# Patient Record
Sex: FEMALE
[De-identification: names, ages, dates, MRNs, and addresses within clinical notes are randomized per-mention and may not be internally consistent; named-entity substitution may affect disease eponyms.]

---

## 2018-10-10 ENCOUNTER — RX ONLY (OUTPATIENT)
Age: 63
Setting detail: RX ONLY
End: 2018-10-10

## 2018-10-24 ENCOUNTER — RX ONLY (OUTPATIENT)
Age: 63
Setting detail: RX ONLY
End: 2018-10-24

## 2018-12-07 ENCOUNTER — RX ONLY (OUTPATIENT)
Age: 63
Setting detail: RX ONLY
End: 2018-12-07

## 2022-02-14 ENCOUNTER — APPOINTMENT (RX ONLY)
Dept: URBAN - METROPOLITAN AREA CLINIC 87 | Facility: CLINIC | Age: 67
Setting detail: DERMATOLOGY
End: 2022-02-14

## 2022-02-14 VITALS — TEMPERATURE: 97.6 F | WEIGHT: 115 LBS | HEIGHT: 63 IN

## 2022-02-14 VITALS — HEIGHT: 63 IN | WEIGHT: 115 LBS

## 2022-02-14 DIAGNOSIS — L40.0 PSORIASIS VULGARIS: ICD-10-CM

## 2022-02-14 DIAGNOSIS — I73.81 ERYTHROMELALGIA: ICD-10-CM

## 2022-02-14 PROCEDURE — ? PATIENT SPECIFIC COUNSELING

## 2022-02-14 PROCEDURE — ? DIAGNOSIS COMMENT

## 2022-02-14 PROCEDURE — ? COUNSELING

## 2022-02-14 PROCEDURE — 99203 OFFICE O/P NEW LOW 30 MIN: CPT

## 2022-02-14 PROCEDURE — ? PRESCRIPTION

## 2022-02-14 PROCEDURE — ? TREATMENT REGIMEN

## 2022-02-14 RX ORDER — CLOBETASOL PROPIONATE 0.5 MG/G
OINTMENT TOPICAL
Qty: 60 | Refills: 5 | Status: ERX | COMMUNITY
Start: 2022-02-14

## 2022-02-14 RX ORDER — GABAPENTIN 100 MG/1
CAPSULE ORAL
Qty: 90 | Refills: 0 | Status: ERX | COMMUNITY
Start: 2022-02-14

## 2022-02-14 RX ORDER — TACROLIMUS 1 MG/G
OINTMENT TOPICAL
Qty: 100 | Refills: 5 | Status: ERX | COMMUNITY
Start: 2022-02-14

## 2022-02-14 RX ADMIN — GABAPENTIN: 100 CAPSULE ORAL at 00:00

## 2022-02-14 RX ADMIN — TACROLIMUS: 1 OINTMENT TOPICAL at 00:00

## 2022-02-14 RX ADMIN — CLOBETASOL PROPIONATE: 0.5 OINTMENT TOPICAL at 00:00

## 2022-02-14 ASSESSMENT — LOCATION SIMPLE DESCRIPTION DERM
LOCATION SIMPLE: LEFT UPPER ARM
LOCATION SIMPLE: LEFT HAND
LOCATION SIMPLE: RIGHT LOWER BACK
LOCATION SIMPLE: RIGHT HAND
LOCATION SIMPLE: RIGHT FOREARM
LOCATION SIMPLE: CHEST

## 2022-02-14 ASSESSMENT — LOCATION ZONE DERM
LOCATION ZONE: HAND
LOCATION ZONE: ARM
LOCATION ZONE: TRUNK

## 2022-02-14 ASSESSMENT — LOCATION DETAILED DESCRIPTION DERM
LOCATION DETAILED: RIGHT INFERIOR LATERAL MIDBACK
LOCATION DETAILED: LEFT ANTECUBITAL SKIN
LOCATION DETAILED: RIGHT VENTRAL MEDIAL PROXIMAL FOREARM
LOCATION DETAILED: RIGHT RADIAL PALM
LOCATION DETAILED: LEFT THENAR EMINENCE
LOCATION DETAILED: UPPER STERNUM

## 2022-02-14 NOTE — PROCEDURE: PATIENT SPECIFIC COUNSELING
Currently pt was not flaring, so the diagnosis is based on history. Ddx: psoriasis, hives, Raynaud’s. Pt denies any arthritic symptoms and reports that she is otherwise healthy.
Detail Level: Zone

## 2022-02-14 NOTE — PROCEDURE: TREATMENT REGIMEN
Action 4: Continue
Otc Regimen: Moisturizing creams
Detail Level: Generalized
Continue Regimen: Clobetasol cream bid prn up to 2 week to trunk and extremities; tacrolimus ointment bid prn to face and mild flares

## 2022-02-14 NOTE — HPI: RASH
How Severe Is Your Rash?: moderate
Is This A New Presentation, Or A Follow-Up?: Rash
Additional History: Pt reports that she has had itchy red spots on the trunk and extremities for years. She was diagnosed with psoriasis and has been applying clobetasol cream for these lesions and tacrolimus ointment for lesions on the face. More recently, she has developed redness and a tingling or burning sensation at the hands and at a lesser degree to the feet. She believes that they worsen with water exposure. She currently is not flaring.

## 2022-03-24 ENCOUNTER — APPOINTMENT (RX ONLY)
Dept: URBAN - METROPOLITAN AREA CLINIC 87 | Facility: CLINIC | Age: 67
Setting detail: DERMATOLOGY
End: 2022-03-24

## 2022-03-24 DIAGNOSIS — L40.0 PSORIASIS VULGARIS: ICD-10-CM

## 2022-03-24 DIAGNOSIS — I73.81 ERYTHROMELALGIA: ICD-10-CM

## 2022-03-24 PROCEDURE — ? DIAGNOSIS COMMENT

## 2022-03-24 PROCEDURE — ? PRESCRIPTION

## 2022-03-24 PROCEDURE — ? TREATMENT REGIMEN

## 2022-03-24 PROCEDURE — ? COUNSELING

## 2022-03-24 PROCEDURE — 99213 OFFICE O/P EST LOW 20 MIN: CPT

## 2022-03-24 PROCEDURE — ? PATIENT SPECIFIC COUNSELING

## 2022-03-24 RX ORDER — LIDOCAINE 50 MG/G
OINTMENT TOPICAL
Qty: 240 | Refills: 0 | Status: ERX | COMMUNITY
Start: 2022-03-24

## 2022-03-24 RX ORDER — CETIRIZINE HYDROCHLORIDE 10 MG/1
TABLET, FILM COATED ORAL
Qty: 30 | Refills: 0 | Status: ERX | COMMUNITY
Start: 2022-03-24

## 2022-03-24 RX ADMIN — LIDOCAINE: 50 OINTMENT TOPICAL at 00:00

## 2022-03-24 RX ADMIN — CETIRIZINE HYDROCHLORIDE: 10 TABLET, FILM COATED ORAL at 00:00

## 2022-03-24 ASSESSMENT — LOCATION DETAILED DESCRIPTION DERM
LOCATION DETAILED: RIGHT RADIAL PALM
LOCATION DETAILED: RIGHT INFERIOR LATERAL MIDBACK
LOCATION DETAILED: RIGHT VENTRAL MEDIAL PROXIMAL FOREARM
LOCATION DETAILED: LEFT ANTECUBITAL SKIN
LOCATION DETAILED: UPPER STERNUM
LOCATION DETAILED: LEFT THENAR EMINENCE

## 2022-03-24 ASSESSMENT — LOCATION SIMPLE DESCRIPTION DERM
LOCATION SIMPLE: RIGHT LOWER BACK
LOCATION SIMPLE: LEFT UPPER ARM
LOCATION SIMPLE: LEFT HAND
LOCATION SIMPLE: RIGHT FOREARM
LOCATION SIMPLE: CHEST
LOCATION SIMPLE: RIGHT HAND

## 2022-03-24 ASSESSMENT — LOCATION ZONE DERM
LOCATION ZONE: TRUNK
LOCATION ZONE: HAND
LOCATION ZONE: ARM

## 2022-03-24 NOTE — PROCEDURE: TREATMENT REGIMEN
Action 1: Continue
Detail Level: Generalized
Initiate Treatment: lidocaine 5 % topical ointment : Apply to affected areas on hands up to 3 times a day\\n\\ncetirizine 10 mg tablet : Take 1 tablet once a day
Continue Regimen: Clobetasol cream bid prn up to 2 week to trunk and extremities; tacrolimus ointment bid prn to face and mild flares
Otc Regimen: Moisturizing creams
Otc Regimen: Baby aspirin
Detail Level: Zone

## 2022-03-24 NOTE — PROCEDURE: PATIENT SPECIFIC COUNSELING
Discussed with patient the importance of avoiding heat and sun. Pt can treat symptomatically with cool compresses. We can try cetirizine and topical lidocaine. There are other treatment options such as nifedipine, beta blockers and antidepressants, but pt may need to see her PCP for these options.
Detail Level: Zone

## 2022-04-25 ENCOUNTER — APPOINTMENT (RX ONLY)
Dept: URBAN - METROPOLITAN AREA CLINIC 87 | Facility: CLINIC | Age: 67
Setting detail: DERMATOLOGY
End: 2022-04-25

## 2022-04-25 DIAGNOSIS — L40.0 PSORIASIS VULGARIS: ICD-10-CM

## 2022-04-25 DIAGNOSIS — I73.81 ERYTHROMELALGIA: ICD-10-CM

## 2022-04-25 PROBLEM — L30.9 DERMATITIS, UNSPECIFIED: Status: ACTIVE | Noted: 2022-04-25

## 2022-04-25 PROCEDURE — 99213 OFFICE O/P EST LOW 20 MIN: CPT

## 2022-04-25 PROCEDURE — ? PRESCRIPTION

## 2022-04-25 PROCEDURE — ? PATIENT SPECIFIC COUNSELING

## 2022-04-25 PROCEDURE — ? TREATMENT REGIMEN

## 2022-04-25 PROCEDURE — ? ADDITIONAL NOTES

## 2022-04-25 PROCEDURE — ? DIAGNOSIS COMMENT

## 2022-04-25 PROCEDURE — ? COUNSELING

## 2022-04-25 RX ORDER — TACROLIMUS 1 MG/G
OINTMENT TOPICAL
Qty: 60 | Refills: 2 | Status: ERX

## 2022-04-25 ASSESSMENT — LOCATION SIMPLE DESCRIPTION DERM
LOCATION SIMPLE: CHEST
LOCATION SIMPLE: RIGHT FOREARM
LOCATION SIMPLE: RIGHT HAND
LOCATION SIMPLE: RIGHT LOWER BACK
LOCATION SIMPLE: LEFT HAND
LOCATION SIMPLE: LEFT UPPER ARM

## 2022-04-25 ASSESSMENT — LOCATION DETAILED DESCRIPTION DERM
LOCATION DETAILED: RIGHT VENTRAL MEDIAL PROXIMAL FOREARM
LOCATION DETAILED: UPPER STERNUM
LOCATION DETAILED: LEFT THENAR EMINENCE
LOCATION DETAILED: LEFT ANTECUBITAL SKIN
LOCATION DETAILED: RIGHT RADIAL PALM
LOCATION DETAILED: RIGHT INFERIOR LATERAL MIDBACK

## 2022-04-25 ASSESSMENT — LOCATION ZONE DERM
LOCATION ZONE: HAND
LOCATION ZONE: TRUNK
LOCATION ZONE: ARM

## 2022-04-25 NOTE — PROCEDURE: PATIENT SPECIFIC COUNSELING
Discussed with patient the importance of avoiding heat and sun. Pt can treat symptomatically with cool compresses. We can try cetirizine and topical lidocaine. There are other treatment options such as nifedipine, beta blockers and antidepressants, but pt may need to see her PCP for these options.\\n\\nPt also reports other symptoms that accompany the burning including swelling at the joints and stiffness. It is possible that the pt has psoriatic or rheumatoid arthritis. However, I explained to the pt that for further evaluation of these symptoms, she should see a rheumatologist.
Detail Level: Zone

## 2022-04-25 NOTE — PROCEDURE: TREATMENT REGIMEN
Otc Regimen: Moisturizing creams
Continue Regimen: Clobetasol cream bid prn up to 2 week to trunk and extremities; tacrolimus ointment bid prn to face and mild flares
Action 4: Continue
Detail Level: Generalized
Continue Regimen: lidocaine 5 % topical ointment : Apply to affected areas on hands up to 3 times a day\\n\\ncetirizine 10 mg tablet : Take 1 tablet once a day
Otc Regimen: Baby aspirin
Plan: Pt reports that she already has had a biopsy done of one of these erythematous lesions and was told that she has psoriasis. It is hard to tell clinically. We can consider a repeat biopsy if warranted by her rheumatologist.
Detail Level: Zone

## 2022-04-25 NOTE — PROCEDURE: ADDITIONAL NOTES
Additional Notes: Refer to Rheumatologist due to patient describing joint pain
Detail Level: Detailed
Render Risk Assessment In Note?: no

## 2022-04-25 NOTE — PROCEDURE: DIAGNOSIS COMMENT
Render Risk Assessment In Note?: no
Comment: Ddx: guttate psoriasis vs papular eczema
Detail Level: Zone